# Patient Record
Sex: MALE | Race: WHITE
[De-identification: names, ages, dates, MRNs, and addresses within clinical notes are randomized per-mention and may not be internally consistent; named-entity substitution may affect disease eponyms.]

---

## 2018-05-11 ENCOUNTER — HOSPITAL ENCOUNTER (OUTPATIENT)
Dept: HOSPITAL 68 - STS | Age: 18
End: 2018-05-11
Payer: COMMERCIAL

## 2018-05-11 VITALS — BODY MASS INDEX: 25.92 KG/M2 | HEIGHT: 69 IN | WEIGHT: 175 LBS

## 2018-05-11 DIAGNOSIS — K40.90: Primary | ICD-10-CM
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
## 2018-05-11 DIAGNOSIS — S39.81XA: ICD-10-CM

## 2018-05-11 PROCEDURE — C9290 INJ, BUPIVACAINE LIPOSOME: HCPCS

## 2018-05-11 PROCEDURE — 64488 TAP BLOCK BI INJECTION: CPT

## 2018-05-11 PROCEDURE — C1781 MESH (IMPLANTABLE): HCPCS

## 2018-05-11 PROCEDURE — 49650 LAP ING HERNIA REPAIR INIT: CPT

## 2018-05-11 PROCEDURE — C9399 UNCLASSIFIED DRUGS OR BIOLOG: HCPCS

## 2018-05-11 NOTE — OPERATIVE REPORT
Operative/Inv Procedure Report
Surgery Date: 05/11/18
Name of Procedure:
Robotic repair right sports hernia with 3-D Max medium mesh
Pre-Operative Diagnosis:
Right sports hernia, combined type
Post-Operative Diagnosis:
Same
Estimated Blood Loss: none
Surgeon/Assistant:
Vanessa CISNEROS,Biaron Pretty PA-C
Anesthesia: general endotracheal tube, block
IV Fluids:
1 L crystalloid
Implants:
3-D Max medium mesh
Drains:
None
Specimens:
None
Complications:
None
Condition:
To PACU stable extubated
Operative Indication:
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 
right groin injury findings consistent with a combined type right sports hernia 
and corroborated by MRI with para symphyseal edema and the pubis.  A right-sided
repair was planned but while away at school there became a question of left-
sided symptoms having developed.  Reviewing his MRI which is negative on the 
left side and with a completely negative left-sided exam I discussed with Shen 
and his mom that we should focus our repair only on the right side at this point
and that his right abductor may require subsequent treatment.  A robotic right-
sided repair is therefore planned.
 
Operative/Procedure Note
Note:
The patient's taken to the operating room placed on the operating table in a 
supine position.  His right groin had been marked in the preop only area.  
Following an awake timeout he underwent uneventful induction of general 
endotracheal anesthesia had his arms tucked by his side, had a tap block with 
ultrasound guidance by anesthesia and then had the lower abdomen and groin 
prepped widely with DuraPrep and draped usual sterile fashion including Ioban.  
He had been shaved in the preop holding area.  Venodyne boots were in place and 
pumping and he received clindamycin IV prior to skin incision as he had a 
penicillin allergy.  Local anesthetic was now treated in the supraumbilical area
where a curvilinear incision was made and carried down to the fascia.  Fascia 
was opened vertically for short distance to expose the preperitoneal fat which 
was elevated and incised carefully along with the peritoneum to gain entry 
safely into the peritoneal cavity.  Finger sweep revealed no adhesions and a 12 
mm aerocele port was placed in a Chavis fashion.  Pneumoperitoneum was achieved 
and a 8 mm da Leonela 30 scope was inserted.  The patient was placed in 10 
Trendelenburg and we surveyed the inguinal floor which appeared normal.  Now to 
8 mm working ports were placed laterally slightly higher on the right than on 
the left or lateral to the epigastric vessels under direct vision and after 
infiltrating local anesthetic.  The XI Celaton Leonela robot was then docked and 
targeted for the right groin.  A monopolar jemal was placed in the right hand #
4In the left hand and fenestrated bipolar #2 port.  The peritoneal flap 4 cm 
above the internal ring was normal but could be seen as a slight dimple.  Given 
the patient's thin body habitus and low body fat one was able to appreciate his 
cord and medial deviation of the vas through the peritoneum.  Flap was carried 
over medially carefully leaving the epigastric vessels uninjured to the anterior
abdominal wall and then crossing the medial umbilical ligament over to the 
midline.  Then dissected down to the pubic symphysis and then worked laterally 
along the ramus.  Here is where the injury in the direct space came into view 
where there was a very thinned out appearance to the posterior fascia and the 
concavity and laxity to the space.  It was no discrete direct hernia.  Now 
working high and lateral I created a pocket in the lateral space and then worked
back across the cord rolling the peritoneum off with care taken not to injure 
the cord.  This dissection went back to where the vas deviated medially.  I 
cleared off the only attachments in the gutter between direct and indirect 
spaces being careful not to injure the underlying iliac vein.  Our 3-D Max 
medium right mesh was selected and placed into the space and overlap the 
symphysis nicely as I had cleared off just to to the left side given the 
findings of edema at the symphysis and wanting a good overlap there.  Laterally,
the tail of the mesh sat nicely in the pocket while the inferior edge set just 
above or I dissected the peritoneum off.  2-0 Tycron sutures were placed into 
the fascia of the rectus the midpoint of the mesh superiorly and the second 
suture just anteriorand superior to the symphysis into some fascia there.  The 
peritoneal flap was now closed with a running 20V LOC absorbable suture with 
excellent coverage. the robot was undocked and the ports removed under direct 
vision.  I closed the fascia at the umbilicus with 2 figure-of-eight sutures of 
0 Maxon then used 3-0 Vicryl on the subcutaneous tissue followed by 4-0 Monocryl
running subcutaneous tic or skin closure throughout.  Strips, 2 x 2's and OpSite
were placed.  The patient tolerated the procedure well was taken to the recovery
room extubated in stable condition with all sponge needle and she recounts 
confirms correct 2 completion of the case.  There was no appreciable 
subcutaneous emphysema and a mild pneumo scrotum was decompressed with some 
manual pressure.
Findings:
Thinned posterior wall with concavity and laxity at the direct space consistent 
with his right sports hernia.  No evidence of indirect hernia or lipoma
Discharge Disposition: PACU

## 2020-01-01 ENCOUNTER — EXTERNAL RECORD (OUTPATIENT)
Dept: OTHER | Age: 20
End: 2020-01-01

## 2020-09-04 LAB
ALBUMIN SERPL-MCNC: 4.8 G/DL (ref 3.5–5)
ALBUMIN/GLOB SERPL: 1.8 {RATIO} (ref 0.7–3.2)
ALP SERPL-CCNC: 87 U/L (ref 38–126)
ALT SERPL-CCNC: 65 U/L
ANION GAP SERPL CALC-SCNC: 16.6 MEQ/L (ref 6–16)
AST SERPL-CCNC: 93 U/L (ref 17–59)
BILIRUB SERPL-MCNC: 0.2 MG/DL (ref 0.2–1.3)
BUN SERPL-MCNC: 17 MG/DL (ref 9–20)
BUN/CREAT SERPL: 16.3 (ref 3–33)
CALCIUM SERPL-MCNC: 8.9 MG/DL (ref 8.4–10.2)
CHLORIDE SERPL-SCNC: 105 MEQ/L (ref 98–107)
CO2 SERPL-SCNC: 27 MEQ/L (ref 22–30)
CREAT SERPL-MCNC: 1.04 MG/DL (ref 0.66–1.25)
GLOBULIN SER-MCNC: 2.6 G/DL (ref 1.3–4.4)
GLUCOSE SERPL-MCNC: 135 MG/DL (ref 70–100)
POTASSIUM SERPL-SCNC: 3.6 MEQ/L (ref 3.5–5.1)
PROT SERPL-MCNC: 7.4 G/DL (ref 6.3–8.2)
SODIUM SERPL-SCNC: 145 MEQ/L (ref 135–145)

## 2020-09-10 ENCOUNTER — TELEPHONE (OUTPATIENT)
Dept: FAMILY MEDICINE | Age: 20
End: 2020-09-10

## 2020-09-23 ENCOUNTER — TELEPHONE (OUTPATIENT)
Dept: FAMILY MEDICINE | Age: 20
End: 2020-09-23

## 2020-10-02 ENCOUNTER — OFFICE VISIT (OUTPATIENT)
Dept: FAMILY MEDICINE | Age: 20
End: 2020-10-02

## 2020-10-02 ENCOUNTER — APPOINTMENT (OUTPATIENT)
Dept: LAB | Age: 20
End: 2020-10-02

## 2020-10-02 VITALS
DIASTOLIC BLOOD PRESSURE: 76 MMHG | HEART RATE: 78 BPM | HEIGHT: 68 IN | WEIGHT: 181 LBS | BODY MASS INDEX: 27.43 KG/M2 | SYSTOLIC BLOOD PRESSURE: 118 MMHG

## 2020-10-02 DIAGNOSIS — S09.90XA CLOSED HEAD INJURY, INITIAL ENCOUNTER: ICD-10-CM

## 2020-10-02 DIAGNOSIS — D72.829 LEUKOCYTOSIS, UNSPECIFIED TYPE: ICD-10-CM

## 2020-10-02 DIAGNOSIS — S06.5XAA SUBDURAL HEMATOMA (CMD): ICD-10-CM

## 2020-10-02 DIAGNOSIS — Z23 NEED FOR VACCINATION: Primary | ICD-10-CM
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
## 2020-10-02 DIAGNOSIS — S02.91XA CLOSED FRACTURE OF SKULL, UNSPECIFIED BONE, INITIAL ENCOUNTER (CMD): ICD-10-CM

## 2020-10-02 PROBLEM — F32.A ANXIETY AND DEPRESSION: Status: ACTIVE | Noted: 2020-10-02

## 2020-10-02 PROBLEM — F41.9 ANXIETY AND DEPRESSION: Status: ACTIVE | Noted: 2020-10-02

## 2020-10-02 LAB
ALBUMIN SERPL-MCNC: 4.1 G/DL (ref 3.6–5.1)
ALBUMIN/GLOB SERPL: 1.1 {RATIO} (ref 1–2.4)
ALP SERPL-CCNC: 107 UNITS/L (ref 45–117)
ALT SERPL-CCNC: 30 UNITS/L
ANION GAP SERPL CALC-SCNC: 9 MMOL/L (ref 10–20)
AST SERPL-CCNC: 21 UNITS/L
BILIRUB SERPL-MCNC: 0.6 MG/DL (ref 0.2–1)
BUN SERPL-MCNC: 18 MG/DL (ref 6–20)
BUN/CREAT SERPL: 15 (ref 7–25)
CALCIUM SERPL-MCNC: 9.3 MG/DL (ref 8.4–10.2)
CHLORIDE SERPL-SCNC: 104 MMOL/L (ref 98–107)
CO2 SERPL-SCNC: 31 MMOL/L (ref 21–32)
CREAT SERPL-MCNC: 1.21 MG/DL (ref 0.67–1.17)
DEPRECATED RDW RBC: 40 FL (ref 39–50)
ERYTHROCYTE [DISTWIDTH] IN BLOOD: 11.9 % (ref 11–15)
FASTING DURATION TIME PATIENT: 1 HOURS
GFR SERPLBLD BASED ON 1.73 SQ M-ARVRAT: 85 ML/MIN/1.73M2
GLOBULIN SER-MCNC: 3.6 G/DL (ref 2–4)
GLUCOSE SERPL-MCNC: 68 MG/DL (ref 65–99)
HCT VFR BLD CALC: 47.1 % (ref 39–51)
HGB BLD-MCNC: 15.9 G/DL (ref 13–17)
MCH RBC QN AUTO: 31 PG (ref 26–34)
MCHC RBC AUTO-ENTMCNC: 33.8 G/DL (ref 32–36.5)
MCV RBC AUTO: 91.8 FL (ref 78–100)
NRBC BLD MANUAL-RTO: 0 /100 WBC
PLATELET # BLD AUTO: 266 K/MCL (ref 140–450)
POTASSIUM SERPL-SCNC: 4.6 MMOL/L (ref 3.4–5.1)
PROT SERPL-MCNC: 7.7 G/DL (ref 6.4–8.2)
RBC # BLD: 5.13 MIL/MCL (ref 4.5–5.9)
SODIUM SERPL-SCNC: 139 MMOL/L (ref 135–145)
WBC # BLD: 7.4 K/MCL (ref 4.2–11)

## 2020-10-02 PROCEDURE — 90471 IMMUNIZATION ADMIN: CPT | Performed by: NURSE PRACTITIONER

## 2020-10-02 PROCEDURE — 36415 COLL VENOUS BLD VENIPUNCTURE: CPT | Performed by: INTERNAL MEDICINE

## 2020-10-02 PROCEDURE — 85027 COMPLETE CBC AUTOMATED: CPT | Performed by: INTERNAL MEDICINE

## 2020-10-02 PROCEDURE — 90686 IIV4 VACC NO PRSV 0.5 ML IM: CPT | Performed by: NURSE PRACTITIONER

## 2020-10-02 PROCEDURE — 99204 OFFICE O/P NEW MOD 45 MIN: CPT | Performed by: NURSE PRACTITIONER

## 2020-10-02 PROCEDURE — 80053 COMPREHEN METABOLIC PANEL: CPT | Performed by: INTERNAL MEDICINE

## 2020-10-02 RX ORDER — BUPROPION HYDROCHLORIDE 300 MG/1
300 TABLET ORAL DAILY
COMMUNITY

## 2020-10-02 RX ORDER — ESCITALOPRAM OXALATE 10 MG/1
10 TABLET ORAL DAILY
COMMUNITY

## 2020-10-02 ASSESSMENT — PATIENT HEALTH QUESTIONNAIRE - PHQ9
4. FEELING TIRED OR HAVING LITTLE ENERGY: SEVERAL DAYS
6. FEELING BAD ABOUT YOURSELF - OR THAT YOU ARE A FAILURE OR HAVE LET YOURSELF OR YOUR FAMILY DOWN: SEVERAL DAYS
7. TROUBLE CONCENTRATING ON THINGS, SUCH AS READING THE NEWSPAPER OR WATCHING TELEVISION: NEARLY EVERY DAY
8. MOVING OR SPEAKING SO SLOWLY THAT OTHER PEOPLE COULD HAVE NOTICED. OR THE OPPOSITE, BEING SO FIGETY OR RESTLESS THAT YOU HAVE BEEN MOVING AROUND A LOT MORE THAN USUAL: NOT AT ALL
CLINICAL INTERPRETATION OF PHQ2 SCORE: MILD DEPRESSION
CLINICAL INTERPRETATION OF PHQ9 SCORE: FURTHER SCREENING NEEDED
2. FEELING DOWN, DEPRESSED OR HOPELESS: MORE THAN HALF THE DAYS
3. TROUBLE FALLING OR STAYING ASLEEP OR SLEEPING TOO MUCH: NOT AT ALL
CLINICAL INTERPRETATION OF PHQ9 SCORE: MILD DEPRESSION
SUM OF ALL RESPONSES TO PHQ9 QUESTIONS 1 TO 9: 8
CLINICAL INTERPRETATION OF PHQ2 SCORE: FURTHER SCREENING NEEDED
SUM OF ALL RESPONSES TO PHQ QUESTIONS 1-9: 8
10. IF YOU CHECKED OFF ANY PROBLEMS, HOW DIFFICULT HAVE THESE PROBLEMS MADE IT FOR YOU TO DO YOUR WORK, TAKE CARE OF THINGS AT HOME, OR GET ALONG WITH OTHER PEOPLE: SOMEWHAT DIFFICULT
1. LITTLE INTEREST OR PLEASURE IN DOING THINGS: SEVERAL DAYS
9. THOUGHTS THAT YOU WOULD BE BETTER OFF DEAD, OR OF HURTING YOURSELF: NOT AT ALL
5. POOR APPETITE, WEIGHT LOSS, OR OVEREATING: NOT AT ALL
SUM OF ALL RESPONSES TO PHQ9 QUESTIONS 1 AND 2: 3
SUM OF ALL RESPONSES TO PHQ9 QUESTIONS 1 AND 2: 3

## 2020-10-06 ENCOUNTER — TELEPHONE (OUTPATIENT)
Dept: FAMILY MEDICINE | Age: 20
End: 2020-10-06

## 2021-07-22 ENCOUNTER — WALK IN (OUTPATIENT)
Dept: URGENT CARE | Age: 21
End: 2021-07-22

## 2021-07-22 VITALS
HEIGHT: 68 IN | HEART RATE: 64 BPM | OXYGEN SATURATION: 96 % | BODY MASS INDEX: 28.79 KG/M2 | RESPIRATION RATE: 18 BRPM | DIASTOLIC BLOOD PRESSURE: 70 MMHG | TEMPERATURE: 98.4 F | WEIGHT: 190 LBS | SYSTOLIC BLOOD PRESSURE: 117 MMHG

## 2021-07-22 DIAGNOSIS — R06.02 SHORTNESS OF BREATH: ICD-10-CM

## 2021-07-22 DIAGNOSIS — R05.9 COUGH: Primary | ICD-10-CM

## 2021-07-22 DIAGNOSIS — J40 BRONCHITIS: ICD-10-CM

## 2021-07-22 LAB
SARS-COV+SARS-COV-2 AG RESP QL IA.RAPID: NOT DETECTED
SERVICE CMNT-IMP: NORMAL

## 2021-07-22 PROCEDURE — 99213 OFFICE O/P EST LOW 20 MIN: CPT | Performed by: NURSE PRACTITIONER

## 2021-07-22 PROCEDURE — 87426 SARSCOV CORONAVIRUS AG IA: CPT | Performed by: INTERNAL MEDICINE

## 2021-07-22 RX ORDER — ALBUTEROL SULFATE 90 UG/1
2 AEROSOL, METERED RESPIRATORY (INHALATION) EVERY 4 HOURS PRN
Qty: 8.5 G | Refills: 0 | Status: SHIPPED | OUTPATIENT
Start: 2021-07-22

## 2021-07-22 RX ORDER — PREDNISONE 20 MG/1
40 TABLET ORAL DAILY
Qty: 10 TABLET | Refills: 0 | Status: SHIPPED | OUTPATIENT
Start: 2021-07-22 | End: 2021-07-27

## 2021-07-26 ENCOUNTER — NURSE TRIAGE (OUTPATIENT)
Dept: GENERAL RADIOLOGY | Age: 21
End: 2021-07-26

## 2021-07-26 ENCOUNTER — WALK IN (OUTPATIENT)
Dept: URGENT CARE | Age: 21
End: 2021-07-26

## 2021-07-26 ENCOUNTER — IMAGING SERVICES (OUTPATIENT)
Dept: GENERAL RADIOLOGY | Age: 21
End: 2021-07-26
Attending: NURSE PRACTITIONER

## 2021-07-26 VITALS
TEMPERATURE: 97.9 F | WEIGHT: 185 LBS | BODY MASS INDEX: 28.04 KG/M2 | RESPIRATION RATE: 16 BRPM | SYSTOLIC BLOOD PRESSURE: 131 MMHG | HEIGHT: 68 IN | OXYGEN SATURATION: 97 % | DIASTOLIC BLOOD PRESSURE: 74 MMHG | HEART RATE: 64 BPM

## 2021-07-26 DIAGNOSIS — R05.9 COUGH: ICD-10-CM

## 2021-07-26 DIAGNOSIS — R04.2 BLOOD IN SPUTUM: ICD-10-CM

## 2021-07-26 DIAGNOSIS — R05.9 COUGH: Primary | ICD-10-CM

## 2021-07-26 PROCEDURE — 71046 X-RAY EXAM CHEST 2 VIEWS: CPT | Performed by: RADIOLOGY

## 2021-07-26 PROCEDURE — 99213 OFFICE O/P EST LOW 20 MIN: CPT | Performed by: NURSE PRACTITIONER
